# Patient Record
Sex: FEMALE | Race: OTHER | HISPANIC OR LATINO | URBAN - METROPOLITAN AREA
[De-identification: names, ages, dates, MRNs, and addresses within clinical notes are randomized per-mention and may not be internally consistent; named-entity substitution may affect disease eponyms.]

---

## 2023-05-29 ENCOUNTER — INPATIENT (INPATIENT)
Facility: HOSPITAL | Age: 88
LOS: 1 days | Discharge: ANOTHER HOSPITAL | DRG: 305 | End: 2023-05-31
Attending: HOSPITALIST | Admitting: HOSPITALIST
Payer: COMMERCIAL

## 2023-05-29 VITALS
DIASTOLIC BLOOD PRESSURE: 84 MMHG | HEART RATE: 104 BPM | OXYGEN SATURATION: 99 % | HEIGHT: 62 IN | TEMPERATURE: 98 F | WEIGHT: 121.03 LBS | RESPIRATION RATE: 18 BRPM | SYSTOLIC BLOOD PRESSURE: 175 MMHG

## 2023-05-29 DIAGNOSIS — R07.9 CHEST PAIN, UNSPECIFIED: ICD-10-CM

## 2023-05-29 LAB
ALBUMIN SERPL ELPH-MCNC: 3.3 G/DL — LOW (ref 3.5–5)
ALP SERPL-CCNC: 60 U/L — SIGNIFICANT CHANGE UP (ref 40–120)
ALT FLD-CCNC: 21 U/L DA — SIGNIFICANT CHANGE UP (ref 10–60)
ANION GAP SERPL CALC-SCNC: 4 MMOL/L — LOW (ref 5–17)
AST SERPL-CCNC: 38 U/L — SIGNIFICANT CHANGE UP (ref 10–40)
BASOPHILS # BLD AUTO: 0.03 K/UL — SIGNIFICANT CHANGE UP (ref 0–0.2)
BASOPHILS NFR BLD AUTO: 0.5 % — SIGNIFICANT CHANGE UP (ref 0–2)
BILIRUB SERPL-MCNC: 0.5 MG/DL — SIGNIFICANT CHANGE UP (ref 0.2–1.2)
BUN SERPL-MCNC: 22 MG/DL — HIGH (ref 7–18)
CALCIUM SERPL-MCNC: 9 MG/DL — SIGNIFICANT CHANGE UP (ref 8.4–10.5)
CHLORIDE SERPL-SCNC: 100 MMOL/L — SIGNIFICANT CHANGE UP (ref 96–108)
CO2 SERPL-SCNC: 26 MMOL/L — SIGNIFICANT CHANGE UP (ref 22–31)
CREAT SERPL-MCNC: 0.64 MG/DL — SIGNIFICANT CHANGE UP (ref 0.5–1.3)
EGFR: 86 ML/MIN/1.73M2 — SIGNIFICANT CHANGE UP
EOSINOPHIL # BLD AUTO: 0.2 K/UL — SIGNIFICANT CHANGE UP (ref 0–0.5)
EOSINOPHIL NFR BLD AUTO: 3.6 % — SIGNIFICANT CHANGE UP (ref 0–6)
GLUCOSE SERPL-MCNC: 95 MG/DL — SIGNIFICANT CHANGE UP (ref 70–99)
HCT VFR BLD CALC: 35.7 % — SIGNIFICANT CHANGE UP (ref 34.5–45)
HGB BLD-MCNC: 12.2 G/DL — SIGNIFICANT CHANGE UP (ref 11.5–15.5)
IMM GRANULOCYTES NFR BLD AUTO: 0.2 % — SIGNIFICANT CHANGE UP (ref 0–0.9)
LIDOCAIN IGE QN: 120 U/L — SIGNIFICANT CHANGE UP (ref 73–393)
LYMPHOCYTES # BLD AUTO: 2.26 K/UL — SIGNIFICANT CHANGE UP (ref 1–3.3)
LYMPHOCYTES # BLD AUTO: 41 % — SIGNIFICANT CHANGE UP (ref 13–44)
MAGNESIUM SERPL-MCNC: 2.1 MG/DL — SIGNIFICANT CHANGE UP (ref 1.6–2.6)
MCHC RBC-ENTMCNC: 29.6 PG — SIGNIFICANT CHANGE UP (ref 27–34)
MCHC RBC-ENTMCNC: 34.2 GM/DL — SIGNIFICANT CHANGE UP (ref 32–36)
MCV RBC AUTO: 86.7 FL — SIGNIFICANT CHANGE UP (ref 80–100)
MONOCYTES # BLD AUTO: 0.46 K/UL — SIGNIFICANT CHANGE UP (ref 0–0.9)
MONOCYTES NFR BLD AUTO: 8.3 % — SIGNIFICANT CHANGE UP (ref 2–14)
NEUTROPHILS # BLD AUTO: 2.55 K/UL — SIGNIFICANT CHANGE UP (ref 1.8–7.4)
NEUTROPHILS NFR BLD AUTO: 46.4 % — SIGNIFICANT CHANGE UP (ref 43–77)
NRBC # BLD: 0 /100 WBCS — SIGNIFICANT CHANGE UP (ref 0–0)
NT-PROBNP SERPL-SCNC: 327 PG/ML — SIGNIFICANT CHANGE UP (ref 0–450)
PLATELET # BLD AUTO: 163 K/UL — SIGNIFICANT CHANGE UP (ref 150–400)
POTASSIUM SERPL-MCNC: 4.6 MMOL/L — SIGNIFICANT CHANGE UP (ref 3.5–5.3)
POTASSIUM SERPL-SCNC: 4.6 MMOL/L — SIGNIFICANT CHANGE UP (ref 3.5–5.3)
PROT SERPL-MCNC: 7.1 G/DL — SIGNIFICANT CHANGE UP (ref 6–8.3)
RBC # BLD: 4.12 M/UL — SIGNIFICANT CHANGE UP (ref 3.8–5.2)
RBC # FLD: 13.2 % — SIGNIFICANT CHANGE UP (ref 10.3–14.5)
SODIUM SERPL-SCNC: 130 MMOL/L — LOW (ref 135–145)
TROPONIN I, HIGH SENSITIVITY RESULT: 9.7 NG/L — SIGNIFICANT CHANGE UP
WBC # BLD: 5.51 K/UL — SIGNIFICANT CHANGE UP (ref 3.8–10.5)
WBC # FLD AUTO: 5.51 K/UL — SIGNIFICANT CHANGE UP (ref 3.8–10.5)

## 2023-05-29 PROCEDURE — 71045 X-RAY EXAM CHEST 1 VIEW: CPT | Mod: 26

## 2023-05-29 PROCEDURE — 99285 EMERGENCY DEPT VISIT HI MDM: CPT

## 2023-05-29 PROCEDURE — 99223 1ST HOSP IP/OBS HIGH 75: CPT

## 2023-05-29 RX ORDER — ONDANSETRON 8 MG/1
4 TABLET, FILM COATED ORAL EVERY 8 HOURS
Refills: 0 | Status: DISCONTINUED | OUTPATIENT
Start: 2023-05-29 | End: 2023-05-31

## 2023-05-29 RX ORDER — ENOXAPARIN SODIUM 100 MG/ML
40 INJECTION SUBCUTANEOUS EVERY 24 HOURS
Refills: 0 | Status: DISCONTINUED | OUTPATIENT
Start: 2023-05-29 | End: 2023-05-31

## 2023-05-29 RX ORDER — ACETAMINOPHEN 500 MG
650 TABLET ORAL EVERY 6 HOURS
Refills: 0 | Status: DISCONTINUED | OUTPATIENT
Start: 2023-05-29 | End: 2023-05-31

## 2023-05-29 RX ORDER — LANOLIN ALCOHOL/MO/W.PET/CERES
3 CREAM (GRAM) TOPICAL AT BEDTIME
Refills: 0 | Status: DISCONTINUED | OUTPATIENT
Start: 2023-05-29 | End: 2023-05-31

## 2023-05-29 NOTE — H&P ADULT - HISTORY OF PRESENT ILLNESS
87 year old, female PMH HLD, HTN, arthritis, osteoporosis , CAD comes in complaining of gradual onset, stable, intermittent, diffuse chest pain last week. Came to the ED today due to high blood pressure associated with bilateral arm pain L>>R and associated with the chest pain last week. At home, The highest BP was 180s systolic.     Describes the arm pain with exertion and while in a moving car, and associated with dizziness, as well as purple in the hand and finger tips, hiccups, shortness of breath  and nausea and occasional palpitations.   No radiation, alleviation, aggravating factors.     She denies fever, chills, vomiting, recent illness. No changes in bowel or bladder function.     In the ED /84, heart rate 104 EKG  Afebrile and saturating 99% on room air  Labs, unremarkable, troponins negative x1   87 year old, female PMH HLD, HTN, arthritis, osteoporosis , CAD comes in complaining of gradual onset, stable, intermittent, diffuse chest pain last week. Came to the ED today due to high blood pressure associated with bilateral arm pain L>>R and associated with the chest pain last week. At home, The highest BP was 180s systolic.     Describes the arm pain with exertion and while in a moving car, and associated with dizziness, as well as purple in the hand and finger tips, hiccups, shortness of breath  and nausea and occasional palpitations.   No radiation, alleviation, aggravating factors.     She denies fever, chills, vomiting, recent illness. No changes in bowel or bladder function.     In the ED /84, heart rate 104 EKG no NORI  Afebrile and saturating 99% on room air  Labs, unremarkable, troponins negative x1

## 2023-05-29 NOTE — ED ADULT NURSE NOTE - NSFALLHARMRISKINTERV_ED_ALL_ED

## 2023-05-29 NOTE — H&P ADULT - NSHPLABSRESULTS_GEN_ALL_CORE
12.2   5.51  )-----------( 163      ( 29 May 2023 20:09 )             35.7     05-29    130<L>  |  100  |  22<H>  ----------------------------<  95  4.6   |  26  |  0.64    Ca    9.0      29 May 2023 18:00  Mg     2.1     05-29    TPro  7.1  /  Alb  3.3<L>  /  TBili  0.5  /  DBili  x   /  AST  38  /  ALT  21  /  AlkPhos  60  05-29        LIVER FUNCTIONS - ( 29 May 2023 18:00 )  Alb: 3.3 g/dL / Pro: 7.1 g/dL / ALK PHOS: 60 U/L / ALT: 21 U/L DA / AST: 38 U/L / GGT: x                     EKG: sinus no lorenzo changes, sllight QRS widening

## 2023-05-29 NOTE — H&P ADULT - NSICDXPASTMEDICALHX_GEN_ALL_CORE_FT
PAST MEDICAL HISTORY:  Arthritis     CAD (coronary artery disease)     HLD (hyperlipidemia)     HTN (hypertension)     Osteoporosis

## 2023-05-29 NOTE — H&P ADULT - NSHPPHYSICALEXAM_GEN_ALL_CORE
T(C): 36.9 (05-29-23 @ 16:36), Max: 36.9 (05-29-23 @ 16:36)  T(F): 98.5 (05-29-23 @ 16:36), Max: 98.5 (05-29-23 @ 16:36)  HR: 104 (05-29-23 @ 16:36) (104 - 104)  BP: 175/84 (05-29-23 @ 16:36) (175/84 - 175/84)  RR: 18 (05-29-23 @ 16:36) (18 - 18)  SpO2: 99% (05-29-23 @ 16:36) (99% - 99%)    GENERAL: NAD, lying in bed comfortably  HEAD:  Atraumatic, Normocephalic  EYES: EOMI, PERRLA, conjunctiva and sclera clear  ENT: Moist mucous membranes  NECK: Supple, No JVD  CHEST/LUNG: Clear to auscultation bilaterally; No rales, rhonchi, wheezing, or rubs. Unlabored respirations  HEART: Regular rate and rhythm; No murmurs, rubs, or gallops  ABDOMEN: Bowel sounds present; Soft, Nontender, Nondistended. No hepatomegally  EXTREMITIES:  2+ Peripheral Pulses, brisk capillary refill. No clubbing, cyanosis, or edema  NERVOUS SYSTEM:  Alert & Oriented X3, speech clear. No deficits   MSK: FROM all 4 extremities, full and equal strength  SKIN: No rashes or lesions

## 2023-05-29 NOTE — H&P ADULT - PROBLEM SELECTOR PLAN 1
Pt states she has had arm and chest pain   In the setting of high  BP in the 180s, patient states that in the past, she has not had any severe symptoms even with BP in the 200s    -BP in the ED is 170s and improved  -Pain is also improved  -purple discoloration during epidsodes, likely due to vasoconstrition   -used to take amlodipine last year, now on HCTZ   -could consider resuming, or Nifedipine Comes in with arm pain, associated with chest pain   found to have EKG with sinus no lorenzo   trop wnl  -Pain happened at rest:- unstable chest pain   now admitted to rule out ACS  no caffeine  f/u lipid panel, A1c, TSH  f/u Echo  maintain on tele  cardiology Bola Weiss

## 2023-05-29 NOTE — ED PROVIDER NOTE - OBJECTIVE STATEMENT
86 y/o female, history of hypertension and CAD, presenting w/ intermittent chest pain and shortness of breath, worse w/ exertion, accompanied by dizziness. Patient is accompanied by home health aide. No known drug allergies.

## 2023-05-29 NOTE — H&P ADULT - NSHPREVIEWOFSYSTEMS_GEN_ALL_CORE
REVIEW OF SYSTEMS:    CONSTITUTIONAL: No weakness, fevers or chills  EYES/ENT: No visual changes;  No vertigo or throat pain   NECK: No pain or stiffness  RESPIRATORY: No cough, wheezing, hemoptysis; +SOB  CARDIOVASCULAR: + chest pain and palpitations  GASTROINTESTINAL: No abdominal or epigastric pain. No nausea, vomiting, or hematemesis; No diarrhea or constipation. No melena or hematochezia.  GENITOURINARY: No dysuria, frequency or hematuria  NEUROLOGICAL: No numbness or weakness, arm pain soreness  SKIN: No itching, rashes

## 2023-05-29 NOTE — ED PROVIDER NOTE - CLINICAL SUMMARY MEDICAL DECISION MAKING FREE TEXT BOX
86 y/o female w/ chest pain. Concern for ACS. No signs to suggest PE. Will perform EKG, labs, chest X-ray, and reassess.

## 2023-05-29 NOTE — H&P ADULT - PROBLEM SELECTOR PLAN 2
Comes in with arm pain, associated with chest pain   found to have EKG with   Trop and BNP elevated  now admitted to rule out ACS  f/u lipid panel, A1c, TSH  f/u Echo  maintain on tele  cardiology Dr; consulted Comes in with arm pain, associated with chest pain   found to have EKG with sinus no lorenzo   trop wnl  now admitted to rule out ACS  f/u lipid panel, A1c, TSH  f/u Echo  maintain on tele  cardiology Bola Weiss Pt states she has had arm and chest pain   In the setting of high  BP in the 180s, patient states that in the past, she has not had any severe symptoms even with BP in the 200s    -BP in the ED is 170s and improved  -Pain is also improved  -purple discoloration during epidsodes, likely due to vasoconstriction   -used to take amlodipine last year, now on HCTZ   -could consider resuming, or Nifedipine

## 2023-05-29 NOTE — ED PROVIDER NOTE - HIV OFFER
Will need an amiodarone work up in June.     Thanks, [employer]
oriented to person, place, time and situation
Previously Declined (within the last year)

## 2023-05-29 NOTE — H&P ADULT - ATTENDING COMMENTS
Hx of CAD, HTN   +CP, dyspnea with effort    Vital Signs Last 24 Hrs  T(C): 36.9 (29 May 2023 16:36), Max: 36.9 (29 May 2023 16:36)  T(F): 98.5 (29 May 2023 16:36), Max: 98.5 (29 May 2023 16:36)  HR: 104 (29 May 2023 16:36) (104 - 104)  BP: 175/84 (29 May 2023 16:36) (175/84 - 175/84)  RR: 18 (29 May 2023 16:36) (18 - 18)  SpO2: 99% (29 May 2023 16:36) (99% - 99%)  Parameters below as of 29 May 2023 16:36  Patient On (Oxygen Delivery Method): room air    Labs reviewed  cbc unremarkable   Na 130  Trop in normal range  CXR   - no acute findings on my review. Final report pending     EKG reviewed    Impression   Acute chest pain   - concern for unstable angina  trop and ekg unremarkable     Plan   admit to telemetry   serial troponin and EKG as needed  BB/statin/ASA  Pain control - SLNG 0.4mg q 5mins X 3 prn                              morphine   A1c/lipid panel - fasting   ECHO   Cardiology consult Hx of CAD, HTN   +CP, dyspnea with effort a week prior  Discussed with Dr Grullon    Vital Signs Last 24 Hrs  T(C): 36.9 (29 May 2023 16:36), Max: 36.9 (29 May 2023 16:36)  T(F): 98.5 (29 May 2023 16:36), Max: 98.5 (29 May 2023 16:36)  HR: 104 (29 May 2023 16:36) (104 - 104)  BP: 175/84 (29 May 2023 16:36) (175/84 - 175/84)  RR: 18 (29 May 2023 16:36) (18 - 18)  SpO2: 99% (29 May 2023 16:36) (99% - 99%)  Parameters below as of 29 May 2023 16:36  Patient On (Oxygen Delivery Method): room air    - Labs reviewed  cbc unremarkable   Na 130  Trop in normal range    CXR   - no acute findings on my review. Final report pending     EKG reviewed- no acute ST or T wave changes    Impression   Acute chest pain   - concern for unstable angina  trop and EKG unremarkable     HTN   uncontrolled despite compliance on meds    Plan   admit to telemetry   serial troponin and EKG as needed  BB/statin/ASA  Pain control - SLNG 0.4mg q 5mins X 3 prn                              morphine   A1c/lipid panel - fasting   ECHO   Cardiology consult  Stress test   Resume BP meds   Monitor BP

## 2023-05-29 NOTE — H&P ADULT - ASSESSMENT
87 year old, female PMH HLD, HTN, arthritis, osteoporosis , CAD comes in complaining of gradual onset, stable, intermittent, diffuse chest pain last week. Came to the ED today due to high blood pressure associated with bilateral arm pain L>>R admitted to rule out ACS.

## 2023-05-29 NOTE — ED PROVIDER NOTE - CARE PLAN
Detail Level: Detailed General Sunscreen Counseling: I recommended a broad spectrum sunscreen with a SPF of 30 or higher.  I explained that SPF 30 sunscreens block approximately 97 percent of the sun's harmful rays.  Sunscreens should be applied at least 15 minutes prior to expected sun exposure and then every 2 hours after that as long as sun exposure continues. If swimming or exercising sunscreen should be reapplied every 45 minutes to an hour after getting wet or sweating. 1 Principal Discharge DX:	Chest pain

## 2023-05-30 DIAGNOSIS — Z29.9 ENCOUNTER FOR PROPHYLACTIC MEASURES, UNSPECIFIED: ICD-10-CM

## 2023-05-30 DIAGNOSIS — I16.0 HYPERTENSIVE URGENCY: ICD-10-CM

## 2023-05-30 DIAGNOSIS — I25.10 ATHEROSCLEROTIC HEART DISEASE OF NATIVE CORONARY ARTERY WITHOUT ANGINA PECTORIS: ICD-10-CM

## 2023-05-30 DIAGNOSIS — I10 ESSENTIAL (PRIMARY) HYPERTENSION: ICD-10-CM

## 2023-05-30 DIAGNOSIS — R07.9 CHEST PAIN, UNSPECIFIED: ICD-10-CM

## 2023-05-30 DIAGNOSIS — Z86.79 PERSONAL HISTORY OF OTHER DISEASES OF THE CIRCULATORY SYSTEM: Chronic | ICD-10-CM

## 2023-05-30 LAB
A1C WITH ESTIMATED AVERAGE GLUCOSE RESULT: 6.3 % — HIGH (ref 4–5.6)
ALBUMIN SERPL ELPH-MCNC: 3.2 G/DL — LOW (ref 3.5–5)
ALP SERPL-CCNC: 60 U/L — SIGNIFICANT CHANGE UP (ref 40–120)
ALT FLD-CCNC: 20 U/L DA — SIGNIFICANT CHANGE UP (ref 10–60)
ANION GAP SERPL CALC-SCNC: 5 MMOL/L — SIGNIFICANT CHANGE UP (ref 5–17)
APPEARANCE UR: CLEAR — SIGNIFICANT CHANGE UP
AST SERPL-CCNC: 14 U/L — SIGNIFICANT CHANGE UP (ref 10–40)
BACTERIA # UR AUTO: NEGATIVE /HPF — SIGNIFICANT CHANGE UP
BASOPHILS # BLD AUTO: 0.03 K/UL — SIGNIFICANT CHANGE UP (ref 0–0.2)
BASOPHILS NFR BLD AUTO: 0.6 % — SIGNIFICANT CHANGE UP (ref 0–2)
BILIRUB SERPL-MCNC: 0.4 MG/DL — SIGNIFICANT CHANGE UP (ref 0.2–1.2)
BILIRUB UR-MCNC: NEGATIVE — SIGNIFICANT CHANGE UP
BUN SERPL-MCNC: 21 MG/DL — HIGH (ref 7–18)
CALCIUM SERPL-MCNC: 9.1 MG/DL — SIGNIFICANT CHANGE UP (ref 8.4–10.5)
CHLORIDE SERPL-SCNC: 104 MMOL/L — SIGNIFICANT CHANGE UP (ref 96–108)
CHOLEST SERPL-MCNC: 219 MG/DL — HIGH
CO2 SERPL-SCNC: 25 MMOL/L — SIGNIFICANT CHANGE UP (ref 22–31)
COLOR SPEC: YELLOW — SIGNIFICANT CHANGE UP
COMMENT - URINE: SIGNIFICANT CHANGE UP
CREAT SERPL-MCNC: 0.85 MG/DL — SIGNIFICANT CHANGE UP (ref 0.5–1.3)
DIFF PNL FLD: ABNORMAL
EGFR: 67 ML/MIN/1.73M2 — SIGNIFICANT CHANGE UP
EOSINOPHIL # BLD AUTO: 0.23 K/UL — SIGNIFICANT CHANGE UP (ref 0–0.5)
EOSINOPHIL NFR BLD AUTO: 4.2 % — SIGNIFICANT CHANGE UP (ref 0–6)
EPI CELLS # UR: SIGNIFICANT CHANGE UP /HPF
ESTIMATED AVERAGE GLUCOSE: 134 MG/DL — HIGH (ref 68–114)
GLUCOSE SERPL-MCNC: 104 MG/DL — HIGH (ref 70–99)
GLUCOSE UR QL: NEGATIVE — SIGNIFICANT CHANGE UP
HCT VFR BLD CALC: 36.4 % — SIGNIFICANT CHANGE UP (ref 34.5–45)
HDLC SERPL-MCNC: 38 MG/DL — LOW
HGB BLD-MCNC: 12.4 G/DL — SIGNIFICANT CHANGE UP (ref 11.5–15.5)
IMM GRANULOCYTES NFR BLD AUTO: 0.2 % — SIGNIFICANT CHANGE UP (ref 0–0.9)
KETONES UR-MCNC: NEGATIVE — SIGNIFICANT CHANGE UP
LEUKOCYTE ESTERASE UR-ACNC: NEGATIVE — SIGNIFICANT CHANGE UP
LIPID PNL WITH DIRECT LDL SERPL: 146 MG/DL — HIGH
LYMPHOCYTES # BLD AUTO: 2.52 K/UL — SIGNIFICANT CHANGE UP (ref 1–3.3)
LYMPHOCYTES # BLD AUTO: 46.3 % — HIGH (ref 13–44)
MAGNESIUM SERPL-MCNC: 2.1 MG/DL — SIGNIFICANT CHANGE UP (ref 1.6–2.6)
MCHC RBC-ENTMCNC: 29.8 PG — SIGNIFICANT CHANGE UP (ref 27–34)
MCHC RBC-ENTMCNC: 34.1 GM/DL — SIGNIFICANT CHANGE UP (ref 32–36)
MCV RBC AUTO: 87.5 FL — SIGNIFICANT CHANGE UP (ref 80–100)
MONOCYTES # BLD AUTO: 0.54 K/UL — SIGNIFICANT CHANGE UP (ref 0–0.9)
MONOCYTES NFR BLD AUTO: 9.9 % — SIGNIFICANT CHANGE UP (ref 2–14)
NEUTROPHILS # BLD AUTO: 2.11 K/UL — SIGNIFICANT CHANGE UP (ref 1.8–7.4)
NEUTROPHILS NFR BLD AUTO: 38.8 % — LOW (ref 43–77)
NITRITE UR-MCNC: NEGATIVE — SIGNIFICANT CHANGE UP
NON HDL CHOLESTEROL: 181 MG/DL — HIGH
NRBC # BLD: 0 /100 WBCS — SIGNIFICANT CHANGE UP (ref 0–0)
PH UR: 6 — SIGNIFICANT CHANGE UP (ref 5–8)
PHOSPHATE SERPL-MCNC: 3.5 MG/DL — SIGNIFICANT CHANGE UP (ref 2.5–4.5)
PLATELET # BLD AUTO: 166 K/UL — SIGNIFICANT CHANGE UP (ref 150–400)
POTASSIUM SERPL-MCNC: 3.8 MMOL/L — SIGNIFICANT CHANGE UP (ref 3.5–5.3)
POTASSIUM SERPL-SCNC: 3.8 MMOL/L — SIGNIFICANT CHANGE UP (ref 3.5–5.3)
PROT SERPL-MCNC: 6.6 G/DL — SIGNIFICANT CHANGE UP (ref 6–8.3)
PROT UR-MCNC: 15 MG/DL
RBC # BLD: 4.16 M/UL — SIGNIFICANT CHANGE UP (ref 3.8–5.2)
RBC # FLD: 13.3 % — SIGNIFICANT CHANGE UP (ref 10.3–14.5)
RBC CASTS # UR COMP ASSIST: SIGNIFICANT CHANGE UP /HPF (ref 0–2)
SODIUM SERPL-SCNC: 134 MMOL/L — LOW (ref 135–145)
SP GR SPEC: 1.01 — SIGNIFICANT CHANGE UP (ref 1.01–1.02)
TRIGL SERPL-MCNC: 176 MG/DL — HIGH
TROPONIN I, HIGH SENSITIVITY RESULT: 10.5 NG/L — SIGNIFICANT CHANGE UP
TSH SERPL-MCNC: 1.66 UU/ML — SIGNIFICANT CHANGE UP (ref 0.34–4.82)
UROBILINOGEN FLD QL: NEGATIVE — SIGNIFICANT CHANGE UP
WBC # BLD: 5.44 K/UL — SIGNIFICANT CHANGE UP (ref 3.8–10.5)
WBC # FLD AUTO: 5.44 K/UL — SIGNIFICANT CHANGE UP (ref 3.8–10.5)
WBC UR QL: ABNORMAL /HPF (ref 0–5)

## 2023-05-30 PROCEDURE — 99232 SBSQ HOSP IP/OBS MODERATE 35: CPT | Mod: GC

## 2023-05-30 RX ORDER — ASPIRIN/CALCIUM CARB/MAGNESIUM 324 MG
81 TABLET ORAL DAILY
Refills: 0 | Status: DISCONTINUED | OUTPATIENT
Start: 2023-05-30 | End: 2023-05-31

## 2023-05-30 RX ORDER — METOPROLOL TARTRATE 50 MG
25 TABLET ORAL
Refills: 0 | Status: DISCONTINUED | OUTPATIENT
Start: 2023-05-30 | End: 2023-05-31

## 2023-05-30 RX ORDER — GABAPENTIN 400 MG/1
100 CAPSULE ORAL AT BEDTIME
Refills: 0 | Status: DISCONTINUED | OUTPATIENT
Start: 2023-05-30 | End: 2023-05-31

## 2023-05-30 RX ORDER — LOSARTAN POTASSIUM 100 MG/1
100 TABLET, FILM COATED ORAL DAILY
Refills: 0 | Status: DISCONTINUED | OUTPATIENT
Start: 2023-05-30 | End: 2023-05-31

## 2023-05-30 RX ORDER — ATORVASTATIN CALCIUM 80 MG/1
40 TABLET, FILM COATED ORAL AT BEDTIME
Refills: 0 | Status: DISCONTINUED | OUTPATIENT
Start: 2023-05-30 | End: 2023-05-31

## 2023-05-30 RX ADMIN — GABAPENTIN 100 MILLIGRAM(S): 400 CAPSULE ORAL at 21:16

## 2023-05-30 RX ADMIN — ATORVASTATIN CALCIUM 40 MILLIGRAM(S): 80 TABLET, FILM COATED ORAL at 21:16

## 2023-05-30 RX ADMIN — LOSARTAN POTASSIUM 100 MILLIGRAM(S): 100 TABLET, FILM COATED ORAL at 06:15

## 2023-05-30 RX ADMIN — ENOXAPARIN SODIUM 40 MILLIGRAM(S): 100 INJECTION SUBCUTANEOUS at 06:15

## 2023-05-30 RX ADMIN — Medication 25 MILLIGRAM(S): at 06:14

## 2023-05-30 RX ADMIN — Medication 81 MILLIGRAM(S): at 11:21

## 2023-05-30 RX ADMIN — Medication 25 MILLIGRAM(S): at 17:10

## 2023-05-30 NOTE — CONSULT NOTE ADULT - SUBJECTIVE AND OBJECTIVE BOX
PATIENT SEEN AND EXAMINED ON :- 5/30/23  DATE OF SERVICE:    5/30/23         Interim events noted,Labs ,Radiological studies and Cardiology tests reviewed .    MR#[id number]  PATIENT NAME:-[facility] COURSE: HPI:  87 year old, female PMH HLD, HTN, arthritis, osteoporosis , CAD comes in complaining of gradual onset, stable, intermittent, diffuse chest pain last week. Came to the ED today due to high blood pressure associated with bilateral arm pain L>>R and associated with the chest pain last week. At home, The highest BP was 180s systolic.     Describes the arm pain with exertion and while in a moving car, and associated with dizziness, as well as purple in the hand and finger tips, hiccups, shortness of breath  and nausea and occasional palpitations.   No radiation, alleviation, aggravating factors.     She denies fever, chills, vomiting, recent illness. No changes in bowel or bladder function.     In the ED /84, heart rate 104 EKG no NORI  Afebrile and saturating 99% on room air  Labs, unremarkable, troponins negative x1   (29 May 2023 22:10)      INTERIM EVENTS:Patient seen at bedside ,interim events noted.      [facility] -reviewed admission note, no change since admission  HEART FAILURE: Acute[ ]Chronic[ ] Systolic[ ] Diastolic[ ] Combined Systolic and Diastolic[ ]  CAD[ ] CABG[ ] PCI[ ]  DEVICES[ ] PPM[ ] ICD[ ] ILR[ ]  ATRIAL FIBRILLATION[ ] Paroxysmal[ ] Permanent[ ] CHADS2-[  ]  EVAN[ ] CKD1[ ] CKD2[ ] CKD3[ ] CKD4[ ] ESRD[ ]  COPD[ ] HTN[ ]   DM[ ] Type1[ ] Type 2[ ]   CVA[ ] Paresis[ ]    AMBULATION: Assisted[ ] Cane/walker[ ] Independent[ ]    MEDICATIONS  (STANDING):  aspirin  chewable 81 milliGRAM(s) Oral daily  atorvastatin 40 milliGRAM(s) Oral at bedtime  enoxaparin Injectable 40 milliGRAM(s) SubCutaneous every 24 hours  gabapentin 100 milliGRAM(s) Oral at bedtime  hydrochlorothiazide 25 milliGRAM(s) Oral daily  losartan 100 milliGRAM(s) Oral daily  metoprolol tartrate 25 milliGRAM(s) Oral two times a day    MEDICATIONS  (PRN):  acetaminophen     Tablet .. 650 milliGRAM(s) Oral every 6 hours PRN Temp greater or equal to 38C (100.4F), Mild Pain (1 - 3)  aluminum hydroxide/magnesium hydroxide/simethicone Suspension 30 milliLiter(s) Oral every 4 hours PRN Dyspepsia  melatonin 3 milliGRAM(s) Oral at bedtime PRN Insomnia  ondansetron Injectable 4 milliGRAM(s) IV Push every 8 hours PRN Nausea and/or Vomiting            REVIEW OF SYSTEMS:  Constitutional: [ ] fever, [ ]weight loss,  [ ]fatigue [ ]weight gain  Eyes: [ ] visual changes  Respiratory: [ ]shortness of breath;  [ ] cough, [ ]wheezing, [ ]chills, [ ]hemoptysis  Cardiovascular: [ ] chest pain, [ ]palpitations, [ ]dizziness,  [ ]leg swelling[ ]orthopnea[ ]PND  Gastrointestinal: [ ] abdominal pain, [ ]nausea, [ ]vomiting,  [ ]diarrhea [ ]Constipation [ ]Melena  Genitourinary: [ ] dysuria, [ ] hematuria [ ]Hickey  Neurologic: [ ] headaches [ ] tremors[ ]weakness [ ]Paralysis Right[ ] Left[ ]  Skin: [ ] itching, [ ]burning, [ ] rashes  Endocrine: [ ] heat or cold intolerance  Musculoskeletal: [ ] joint pain or swelling; [ ] muscle, back, or extremity pain  Psychiatric: [ ] depression, [ ]anxiety, [ ]mood swings, or [ ]difficulty sleeping  Hematologic: [ ] easy bruising, [ ] bleeding gums    [ ] All remaining systems negative except as per above.   [ ]Unable to obtain.  [x] No change in ROS since admission      Vital Signs Last 24 Hrs  T(C): 36.8 (30 May 2023 19:33), Max: 37.1 (30 May 2023 04:56)  T(F): 98.2 (30 May 2023 19:33), Max: 98.8 (30 May 2023 04:56)  HR: 63 (30 May 2023 19:33) (62 - 75)  BP: 166/73 (30 May 2023 19:33) (123/63 - 166/73)  BP(mean): --  RR: 18 (30 May 2023 19:33) (18 - 19)  SpO2: 99% (30 May 2023 19:33) (96% - 100%)    Parameters below as of 30 May 2023 19:33  Patient On (Oxygen Delivery Method): room air      I&O's Summary      PHYSICAL EXAM:  General: No acute distress BMI-  HEENT: EOMI, PERRL  Neck: Supple, [ ] JVD  Lungs: Equal air entry bilaterally; [ ] rales [ ] wheezing [ ] rhonchi  Heart: Regular rate and rhythm; [x ] murmur   2/6 [ x] systolic [ ] diastolic [ ] radiation[ ] rubs [ ]  gallops  Abdomen: Nontender, bowel sounds present  Extremities: No clubbing, cyanosis, [ ] edema [ ]Pulses  equal and intact  Nervous system:  Alert & Oriented X3, no focal deficits  Psychiatric: Normal affect  Skin: No rashes or lesions    LABS:  05-30    134<L>  |  104  |  21<H>  ----------------------------<  104<H>  3.8   |  25  |  0.85    Ca    9.1      30 May 2023 06:10  Phos  3.5     05-30  Mg     2.1     05-30    TPro  6.6  /  Alb  3.2<L>  /  TBili  0.4  /  DBili  x   /  AST  14  /  ALT  20  /  AlkPhos  60  05-30    Creatinine Trend: 0.85<--, 0.64<--                        12.4   5.44  )-----------( 166      ( 30 May 2023 06:10 )             36.4

## 2023-05-30 NOTE — CONSULT NOTE ADULT - ASSESSMENT
87 year old, female PMH HLD, HTN, arthritis, osteoporosis , CAD comes in complaining of gradual onset, stable, intermittent, diffuse chest pain last week. Came to the ED today due to high blood pressure associated with bilateral arm pain L>>R admitted to rule out ACS.    # Chest pain-  ACS, f/u Echo, D/C tele  # Uncontrolled hypertension, amlodipine and hctz. losartan   # CAD0 simvastatin, asa and beta blocker and losartan   # Prophylactic measure

## 2023-05-30 NOTE — PROGRESS NOTE ADULT - PROBLEM SELECTOR PLAN 1
Comes in with arm pain, associated with chest pain   found to have EKG with sinus no lorenzo   trop wnl  -Pain happened at rest:- unstable chest pain   now admitted to rule out ACS  no caffeine  f/u lipid panel, A1c, TSH  f/u Echo  maintain on tele  cardiology Bola Weiss presented with arm pain, associated with chest pain   found to have EKG with sinus no lorenzo   trop wnl  Pain happened at rest:- unstable chest pain   now admitted to rule out ACS  no caffeine  f/u lipid panel, A1c, TSH  f/u Echo  D/C tele  patient without chest pain  cardiology Bola Weiss

## 2023-05-30 NOTE — CONSULT NOTE ADULT - TIME BILLING
- Review of records, telemetry, vital signs and daily labs.   - General and cardiovascular physical examination.  - Generation of cardiovascular treatment plan.  - Coordination of care.      Patient was seen and examined by me on 5/30/23,interim events noted,labs and radiology studies reviewed.  Freddy Weiss MD,FACC.  [address] [place], [place] [id number].  [phone]

## 2023-05-30 NOTE — PATIENT PROFILE ADULT - FALL HARM RISK - RISK INTERVENTIONS

## 2023-05-30 NOTE — PROGRESS NOTE ADULT - SUBJECTIVE AND OBJECTIVE BOX
PGY-1 Progress Note discussed with attending      PLEASE CONTACT ON CALL TEAM:  - On Call Team (Please refer to Tyson) FROM 5:00 PM - 8:30PM  - Nightfloat Team FROM 8:30 -7:30 AM    INTERVAL HPI/OVERNIGHT EVENTS:       REVIEW OF SYSTEMS:  CONSTITUTIONAL: No fever, weight loss, or fatigue  RESPIRATORY: No cough, wheezing, chills or hemoptysis; No shortness of breath  CARDIOVASCULAR: No chest pain, palpitations, dizziness, or leg swelling  GASTROINTESTINAL: No abdominal pain. No nausea, vomiting, or hematemesis; No diarrhea or constipation. No melena or hematochezia.  GENITOURINARY: No dysuria or hematuria, urinary frequency  NEUROLOGICAL: No headaches, memory loss, loss of strength, numbness, or tremors  SKIN: No itching, burning, rashes, or lesions     MEDICATIONS  (STANDING):  aspirin  chewable 81 milliGRAM(s) Oral daily  atorvastatin 40 milliGRAM(s) Oral at bedtime  enoxaparin Injectable 40 milliGRAM(s) SubCutaneous every 24 hours  gabapentin 100 milliGRAM(s) Oral at bedtime  hydrochlorothiazide 25 milliGRAM(s) Oral daily  losartan 100 milliGRAM(s) Oral daily  metoprolol tartrate 25 milliGRAM(s) Oral two times a day    MEDICATIONS  (PRN):  acetaminophen     Tablet .. 650 milliGRAM(s) Oral every 6 hours PRN Temp greater or equal to 38C (100.4F), Mild Pain (1 - 3)  aluminum hydroxide/magnesium hydroxide/simethicone Suspension 30 milliLiter(s) Oral every 4 hours PRN Dyspepsia  melatonin 3 milliGRAM(s) Oral at bedtime PRN Insomnia  ondansetron Injectable 4 milliGRAM(s) IV Push every 8 hours PRN Nausea and/or Vomiting      Vital Signs Last 24 Hrs  T(C): 37.1 (30 May 2023 04:56), Max: 37.1 (30 May 2023 04:56)  T(F): 98.8 (30 May 2023 04:56), Max: 98.8 (30 May 2023 04:56)  HR: 65 (30 May 2023 04:56) (65 - 104)  BP: 135/65 (30 May 2023 04:56) (130/64 - 175/84)  BP(mean): --  RR: 18 (30 May 2023 04:56) (18 - 18)  SpO2: 99% (30 May 2023 04:56) (96% - 99%)    Parameters below as of 30 May 2023 04:56  Patient On (Oxygen Delivery Method): room air        PHYSICAL EXAMINATION:  GENERAL: NAD, well built  HEAD:  Atraumatic, Normocephalic  EYES:  conjunctiva and sclera clear  NECK: Supple, No JVD, Normal thyroid  CHEST/LUNG: Clear to auscultation. Clear to percussion bilaterally; No rales, rhonchi, wheezing, or rubs  HEART: Regular rate and rhythm; No murmurs, rubs, or gallops  ABDOMEN: Soft, Nontender, Nondistended; Bowel sounds present, no pain or masses on palpation  NERVOUS SYSTEM:  Alert & Oriented X3  : voiding well  EXTREMITIES:  2+ Peripheral Pulses, No clubbing, cyanosis, or edema  SKIN: warm dry                          12.4   5.44  )-----------( 166      ( 30 May 2023 06:10 )             36.4     05-30    134<L>  |  104  |  21<H>  ----------------------------<  x   3.8   |  25  |  x     Ca    9.1      30 May 2023 06:10  Mg     2.1     05-29    TPro  x   /  Alb  3.2<L>  /  TBili  x   /  DBili  x   /  AST  x   /  ALT  x   /  AlkPhos  x   05-30    LIVER FUNCTIONS - ( 30 May 2023 06:10 )  Alb: 3.2 g/dL / Pro: x     / ALK PHOS: x     / ALT: x     / AST: x     / GGT: x                   I&O's Summary          CAPILLARY BLOOD GLUCOSE      RADIOLOGY & ADDITIONAL TESTS:                   PGY-1 Progress Note discussed with attending      PLEASE CONTACT ON CALL TEAM:  - On Call Team (Please refer to Tyson) FROM 5:00 PM - 8:30PM  - Nightfloat Team FROM 8:30 -7:30 AM    INTERVAL HPI/OVERNIGHT EVENTS: No acute events overnight.  Patient examined at bedside this AM.  Patient denies acute complaints.  Patient states she has no dizziness or chest pain.      REVIEW OF SYSTEMS:  CONSTITUTIONAL: No fever, weight loss, or fatigue  RESPIRATORY: No cough, wheezing, chills or hemoptysis; No shortness of breath  CARDIOVASCULAR: No chest pain, palpitations, dizziness, or leg swelling  GASTROINTESTINAL: No abdominal pain. No nausea, vomiting, or hematemesis; No diarrhea or constipation. No melena or hematochezia.  GENITOURINARY: No dysuria or hematuria, urinary frequency  NEUROLOGICAL: No headaches, memory loss, loss of strength, numbness, or tremors  SKIN: No itching, burning, rashes, or lesions     MEDICATIONS  (STANDING):  aspirin  chewable 81 milliGRAM(s) Oral daily  atorvastatin 40 milliGRAM(s) Oral at bedtime  enoxaparin Injectable 40 milliGRAM(s) SubCutaneous every 24 hours  gabapentin 100 milliGRAM(s) Oral at bedtime  hydrochlorothiazide 25 milliGRAM(s) Oral daily  losartan 100 milliGRAM(s) Oral daily  metoprolol tartrate 25 milliGRAM(s) Oral two times a day    MEDICATIONS  (PRN):  acetaminophen     Tablet .. 650 milliGRAM(s) Oral every 6 hours PRN Temp greater or equal to 38C (100.4F), Mild Pain (1 - 3)  aluminum hydroxide/magnesium hydroxide/simethicone Suspension 30 milliLiter(s) Oral every 4 hours PRN Dyspepsia  melatonin 3 milliGRAM(s) Oral at bedtime PRN Insomnia  ondansetron Injectable 4 milliGRAM(s) IV Push every 8 hours PRN Nausea and/or Vomiting      Vital Signs Last 24 Hrs  T(C): 37.1 (30 May 2023 04:56), Max: 37.1 (30 May 2023 04:56)  T(F): 98.8 (30 May 2023 04:56), Max: 98.8 (30 May 2023 04:56)  HR: 65 (30 May 2023 04:56) (65 - 104)  BP: 135/65 (30 May 2023 04:56) (130/64 - 175/84)  BP(mean): --  RR: 18 (30 May 2023 04:56) (18 - 18)  SpO2: 99% (30 May 2023 04:56) (96% - 99%)    Parameters below as of 30 May 2023 04:56  Patient On (Oxygen Delivery Method): room air        PHYSICAL EXAMINATION:  GENERAL: NAD, lying in bed comfortably  HEAD:  Atraumatic, Normocephalic  EYES: EOMI, PERRLA, conjunctiva and sclera clear  ENT: Moist mucous membranes  NECK: Supple, No JVD  CHEST/LUNG: Clear to auscultation bilaterally; No rales, rhonchi, wheezing, or rubs. Unlabored respirations  HEART: Regular rate and rhythm; No murmurs, rubs, or gallops  ABDOMEN: Bowel sounds present; Soft, Nontender, Nondistended. No hepatomegally  EXTREMITIES:  2+ Peripheral Pulses, brisk capillary refill. No clubbing, cyanosis, or edema  NERVOUS SYSTEM:  Alert & Oriented X3, speech clear. No deficits   MSK: FROM all 4 extremities, full and equal strength  SKIN: No rashes or lesions                          12.4   5.44  )-----------( 166      ( 30 May 2023 06:10 )             36.4     05-30    134<L>  |  104  |  21<H>  ----------------------------<  x   3.8   |  25  |  x     Ca    9.1      30 May 2023 06:10  Mg     2.1     05-29    TPro  x   /  Alb  3.2<L>  /  TBili  x   /  DBili  x   /  AST  x   /  ALT  x   /  AlkPhos  x   05-30    LIVER FUNCTIONS - ( 30 May 2023 06:10 )  Alb: 3.2 g/dL / Pro: x     / ALK PHOS: x     / ALT: x     / AST: x     / GGT: x                   I&O's Summary          CAPILLARY BLOOD GLUCOSE      RADIOLOGY & ADDITIONAL TESTS:

## 2023-05-30 NOTE — PROGRESS NOTE ADULT - PROBLEM SELECTOR PLAN 2
Pt states she has had arm and chest pain   In the setting of high  BP in the 180s, patient states that in the past, she has not had any severe symptoms even with BP in the 200s    -BP in the ED is 170s and improved  -Pain is also improved  -purple discoloration during epidsodes, likely due to vasoconstriction   -used to take amlodipine last year, now on HCTZ   -could consider resuming, or Nifedipine Pt states she has had arm and chest pain   In the setting of high  BP in the 180s, patient states that in the past, she has not had any severe symptoms even with BP in the 200s    -BP in the ED was 170s and improved  -Pain is also improved  -purple discoloration during epidsodes, likely due to vasoconstriction   -used to take amlodipine last year, now on HCTZ   - patient bp improved today to 133/68  - continue to monitor bp

## 2023-05-31 ENCOUNTER — INPATIENT (INPATIENT)
Facility: HOSPITAL | Age: 88
LOS: 0 days | Discharge: HOME | End: 2023-06-01
Attending: INTERNAL MEDICINE | Admitting: INTERNAL MEDICINE
Payer: MEDICARE

## 2023-05-31 VITALS
OXYGEN SATURATION: 100 % | HEART RATE: 65 BPM | DIASTOLIC BLOOD PRESSURE: 85 MMHG | TEMPERATURE: 98 F | RESPIRATION RATE: 17 BRPM | SYSTOLIC BLOOD PRESSURE: 159 MMHG

## 2023-05-31 VITALS
HEART RATE: 84 BPM | SYSTOLIC BLOOD PRESSURE: 122 MMHG | RESPIRATION RATE: 18 BRPM | OXYGEN SATURATION: 93 % | TEMPERATURE: 97 F | DIASTOLIC BLOOD PRESSURE: 78 MMHG

## 2023-05-31 DIAGNOSIS — I51.9 HEART DISEASE, UNSPECIFIED: ICD-10-CM

## 2023-05-31 DIAGNOSIS — R07.9 CHEST PAIN, UNSPECIFIED: ICD-10-CM

## 2023-05-31 DIAGNOSIS — I50.9 HEART FAILURE, UNSPECIFIED: ICD-10-CM

## 2023-05-31 DIAGNOSIS — Z86.79 PERSONAL HISTORY OF OTHER DISEASES OF THE CIRCULATORY SYSTEM: Chronic | ICD-10-CM

## 2023-05-31 PROBLEM — M19.90 UNSPECIFIED OSTEOARTHRITIS, UNSPECIFIED SITE: Chronic | Status: ACTIVE | Noted: 2023-05-30

## 2023-05-31 PROBLEM — E78.5 HYPERLIPIDEMIA, UNSPECIFIED: Chronic | Status: ACTIVE | Noted: 2023-05-30

## 2023-05-31 PROBLEM — I10 ESSENTIAL (PRIMARY) HYPERTENSION: Chronic | Status: ACTIVE | Noted: 2023-05-29

## 2023-05-31 PROBLEM — M81.0 AGE-RELATED OSTEOPOROSIS WITHOUT CURRENT PATHOLOGICAL FRACTURE: Chronic | Status: ACTIVE | Noted: 2023-05-30

## 2023-05-31 LAB
ANION GAP SERPL CALC-SCNC: 6 MMOL/L — SIGNIFICANT CHANGE UP (ref 5–17)
BUN SERPL-MCNC: 20 MG/DL — HIGH (ref 7–18)
CALCIUM SERPL-MCNC: 8.9 MG/DL — SIGNIFICANT CHANGE UP (ref 8.4–10.5)
CHLORIDE SERPL-SCNC: 104 MMOL/L — SIGNIFICANT CHANGE UP (ref 96–108)
CO2 SERPL-SCNC: 25 MMOL/L — SIGNIFICANT CHANGE UP (ref 22–31)
CREAT SERPL-MCNC: 0.65 MG/DL — SIGNIFICANT CHANGE UP (ref 0.5–1.3)
EGFR: 86 ML/MIN/1.73M2 — SIGNIFICANT CHANGE UP
GLUCOSE SERPL-MCNC: 95 MG/DL — SIGNIFICANT CHANGE UP (ref 70–99)
HCT VFR BLD CALC: 36.4 % — SIGNIFICANT CHANGE UP (ref 34.5–45)
HGB BLD-MCNC: 12.6 G/DL — SIGNIFICANT CHANGE UP (ref 11.5–15.5)
MCHC RBC-ENTMCNC: 30.3 PG — SIGNIFICANT CHANGE UP (ref 27–34)
MCHC RBC-ENTMCNC: 34.6 GM/DL — SIGNIFICANT CHANGE UP (ref 32–36)
MCV RBC AUTO: 87.5 FL — SIGNIFICANT CHANGE UP (ref 80–100)
NRBC # BLD: 0 /100 WBCS — SIGNIFICANT CHANGE UP (ref 0–0)
PLATELET # BLD AUTO: 161 K/UL — SIGNIFICANT CHANGE UP (ref 150–400)
POTASSIUM SERPL-MCNC: 3.8 MMOL/L — SIGNIFICANT CHANGE UP (ref 3.5–5.3)
POTASSIUM SERPL-SCNC: 3.8 MMOL/L — SIGNIFICANT CHANGE UP (ref 3.5–5.3)
RBC # BLD: 4.16 M/UL — SIGNIFICANT CHANGE UP (ref 3.8–5.2)
RBC # FLD: 13.3 % — SIGNIFICANT CHANGE UP (ref 10.3–14.5)
SODIUM SERPL-SCNC: 135 MMOL/L — SIGNIFICANT CHANGE UP (ref 135–145)
WBC # BLD: 5.01 K/UL — SIGNIFICANT CHANGE UP (ref 3.8–10.5)
WBC # FLD AUTO: 5.01 K/UL — SIGNIFICANT CHANGE UP (ref 3.8–10.5)

## 2023-05-31 PROCEDURE — 83036 HEMOGLOBIN GLYCOSYLATED A1C: CPT

## 2023-05-31 PROCEDURE — 99239 HOSP IP/OBS DSCHRG MGMT >30: CPT | Mod: GC

## 2023-05-31 PROCEDURE — 85025 COMPLETE CBC W/AUTO DIFF WBC: CPT

## 2023-05-31 PROCEDURE — 84100 ASSAY OF PHOSPHORUS: CPT

## 2023-05-31 PROCEDURE — 80053 COMPREHEN METABOLIC PANEL: CPT

## 2023-05-31 PROCEDURE — 80048 BASIC METABOLIC PNL TOTAL CA: CPT

## 2023-05-31 PROCEDURE — 99285 EMERGENCY DEPT VISIT HI MDM: CPT | Mod: 25

## 2023-05-31 PROCEDURE — 93458 L HRT ARTERY/VENTRICLE ANGIO: CPT | Mod: 26,59

## 2023-05-31 PROCEDURE — 93306 TTE W/DOPPLER COMPLETE: CPT

## 2023-05-31 PROCEDURE — 84443 ASSAY THYROID STIM HORMONE: CPT

## 2023-05-31 PROCEDURE — 83690 ASSAY OF LIPASE: CPT

## 2023-05-31 PROCEDURE — 83735 ASSAY OF MAGNESIUM: CPT

## 2023-05-31 PROCEDURE — 84484 ASSAY OF TROPONIN QUANT: CPT

## 2023-05-31 PROCEDURE — 81001 URINALYSIS AUTO W/SCOPE: CPT

## 2023-05-31 PROCEDURE — 80061 LIPID PANEL: CPT

## 2023-05-31 PROCEDURE — 93010 ELECTROCARDIOGRAM REPORT: CPT

## 2023-05-31 PROCEDURE — 83880 ASSAY OF NATRIURETIC PEPTIDE: CPT

## 2023-05-31 PROCEDURE — 85027 COMPLETE CBC AUTOMATED: CPT

## 2023-05-31 PROCEDURE — 92928 PRQ TCAT PLMT NTRAC ST 1 LES: CPT | Mod: RC

## 2023-05-31 PROCEDURE — 36415 COLL VENOUS BLD VENIPUNCTURE: CPT

## 2023-05-31 PROCEDURE — 93005 ELECTROCARDIOGRAM TRACING: CPT

## 2023-05-31 PROCEDURE — 71045 X-RAY EXAM CHEST 1 VIEW: CPT

## 2023-05-31 RX ORDER — LOSARTAN POTASSIUM 100 MG/1
1 TABLET, FILM COATED ORAL
Refills: 0 | DISCHARGE

## 2023-05-31 RX ORDER — AMLODIPINE BESYLATE 2.5 MG/1
1 TABLET ORAL
Refills: 0 | DISCHARGE

## 2023-05-31 RX ORDER — HYDROCHLOROTHIAZIDE 25 MG
1 TABLET ORAL
Refills: 0 | DISCHARGE

## 2023-05-31 RX ORDER — ASPIRIN/CALCIUM CARB/MAGNESIUM 324 MG
81 TABLET ORAL DAILY
Refills: 0 | Status: DISCONTINUED | OUTPATIENT
Start: 2023-06-01 | End: 2023-06-01

## 2023-05-31 RX ORDER — ASPIRIN/CALCIUM CARB/MAGNESIUM 324 MG
1 TABLET ORAL
Refills: 0 | DISCHARGE

## 2023-05-31 RX ORDER — LOSARTAN POTASSIUM 100 MG/1
1 TABLET, FILM COATED ORAL
Qty: 0 | Refills: 0 | DISCHARGE

## 2023-05-31 RX ORDER — HEPARIN SODIUM 5000 [USP'U]/ML
5000 INJECTION INTRAVENOUS; SUBCUTANEOUS EVERY 12 HOURS
Refills: 0 | Status: DISCONTINUED | OUTPATIENT
Start: 2023-06-01 | End: 2023-06-01

## 2023-05-31 RX ORDER — METOPROLOL TARTRATE 50 MG
1 TABLET ORAL
Qty: 0 | Refills: 0 | DISCHARGE

## 2023-05-31 RX ORDER — GABAPENTIN 400 MG/1
1 CAPSULE ORAL
Qty: 0 | Refills: 0 | DISCHARGE

## 2023-05-31 RX ORDER — ATORVASTATIN CALCIUM 80 MG/1
40 TABLET, FILM COATED ORAL AT BEDTIME
Refills: 0 | Status: DISCONTINUED | OUTPATIENT
Start: 2023-05-31 | End: 2023-06-01

## 2023-05-31 RX ORDER — ALENDRONATE SODIUM 70 MG/1
0 TABLET ORAL
Qty: 0 | Refills: 0 | DISCHARGE

## 2023-05-31 RX ORDER — METOPROLOL TARTRATE 50 MG
1 TABLET ORAL
Refills: 0 | DISCHARGE

## 2023-05-31 RX ORDER — ALENDRONATE SODIUM 70 MG/1
1 TABLET ORAL
Refills: 0 | DISCHARGE

## 2023-05-31 RX ORDER — AMLODIPINE BESYLATE 2.5 MG/1
5 TABLET ORAL DAILY
Refills: 0 | Status: DISCONTINUED | OUTPATIENT
Start: 2023-05-31 | End: 2023-06-01

## 2023-05-31 RX ORDER — SODIUM CHLORIDE 9 MG/ML
500 INJECTION INTRAMUSCULAR; INTRAVENOUS; SUBCUTANEOUS
Refills: 0 | Status: DISCONTINUED | OUTPATIENT
Start: 2023-05-31 | End: 2023-06-01

## 2023-05-31 RX ORDER — HYDROCHLOROTHIAZIDE 25 MG
1 TABLET ORAL
Qty: 0 | Refills: 0 | DISCHARGE

## 2023-05-31 RX ORDER — SIMVASTATIN 20 MG/1
1 TABLET, FILM COATED ORAL
Qty: 0 | Refills: 0 | DISCHARGE

## 2023-05-31 RX ORDER — LOSARTAN POTASSIUM 100 MG/1
100 TABLET, FILM COATED ORAL DAILY
Refills: 0 | Status: DISCONTINUED | OUTPATIENT
Start: 2023-05-31 | End: 2023-06-01

## 2023-05-31 RX ORDER — SODIUM CHLORIDE 9 MG/ML
3 INJECTION INTRAMUSCULAR; INTRAVENOUS; SUBCUTANEOUS EVERY 8 HOURS
Refills: 0 | Status: DISCONTINUED | OUTPATIENT
Start: 2023-05-31 | End: 2023-06-01

## 2023-05-31 RX ORDER — CLOPIDOGREL BISULFATE 75 MG/1
75 TABLET, FILM COATED ORAL DAILY
Refills: 0 | Status: DISCONTINUED | OUTPATIENT
Start: 2023-06-01 | End: 2023-06-01

## 2023-05-31 RX ORDER — METOPROLOL TARTRATE 50 MG
25 TABLET ORAL
Refills: 0 | Status: DISCONTINUED | OUTPATIENT
Start: 2023-05-31 | End: 2023-06-01

## 2023-05-31 RX ADMIN — SODIUM CHLORIDE 3 MILLILITER(S): 9 INJECTION INTRAMUSCULAR; INTRAVENOUS; SUBCUTANEOUS at 22:28

## 2023-05-31 RX ADMIN — Medication 81 MILLIGRAM(S): at 11:55

## 2023-05-31 RX ADMIN — Medication 25 MILLIGRAM(S): at 18:08

## 2023-05-31 RX ADMIN — ATORVASTATIN CALCIUM 40 MILLIGRAM(S): 80 TABLET, FILM COATED ORAL at 22:32

## 2023-05-31 RX ADMIN — LOSARTAN POTASSIUM 100 MILLIGRAM(S): 100 TABLET, FILM COATED ORAL at 22:32

## 2023-05-31 RX ADMIN — ENOXAPARIN SODIUM 40 MILLIGRAM(S): 100 INJECTION SUBCUTANEOUS at 05:54

## 2023-05-31 RX ADMIN — SODIUM CHLORIDE 75 MILLILITER(S): 9 INJECTION INTRAMUSCULAR; INTRAVENOUS; SUBCUTANEOUS at 17:34

## 2023-05-31 RX ADMIN — LOSARTAN POTASSIUM 100 MILLIGRAM(S): 100 TABLET, FILM COATED ORAL at 05:54

## 2023-05-31 RX ADMIN — SODIUM CHLORIDE 3 MILLILITER(S): 9 INJECTION INTRAMUSCULAR; INTRAVENOUS; SUBCUTANEOUS at 13:43

## 2023-05-31 RX ADMIN — Medication 25 MILLIGRAM(S): at 05:54

## 2023-05-31 RX ADMIN — SODIUM CHLORIDE 75 MILLILITER(S): 9 INJECTION INTRAMUSCULAR; INTRAVENOUS; SUBCUTANEOUS at 18:45

## 2023-05-31 RX ADMIN — SODIUM CHLORIDE 75 MILLILITER(S): 9 INJECTION INTRAMUSCULAR; INTRAVENOUS; SUBCUTANEOUS at 18:08

## 2023-05-31 RX ADMIN — AMLODIPINE BESYLATE 5 MILLIGRAM(S): 2.5 TABLET ORAL at 18:08

## 2023-05-31 NOTE — ACUTE INTERFACILITY TRANSFER NOTE - HOSPITAL COURSE
87 year old, female PMH HTN, HLD, arthritis, osteoporosis , and CAD who came in complaining of gradual onset, stable, intermittent, diffuse chest pain for 1 week. She presented to the ED due to high blood pressure associated with bilateral arm pain L>>R and dizziness.  She was admitted to [facility] for ACS workup.  Patient was hemodynamically stable with a Blood pressure on admission of 175/84.  Patient have negative troponin x2.  Patient had EKG showing NSR.  Patient was evaluated by cardiology Dr. Weiss.  Patient had an echocardiogram which revealed a reduced EF of 30-35%.  Patient was continued on their home blood pressure meds and their blood pressure was well controlled while in the hospital. Patient will be transfered to [facility] for cardiac cath

## 2023-05-31 NOTE — PATIENT PROFILE ADULT - MST SCORE
DISPLAY PLAN FREE TEXT
0

## 2023-05-31 NOTE — DISCHARGE NOTE PROVIDER - NSDCHOSPICE_GEN_A_CORE
How to Stop Smoking   WHAT YOU NEED TO KNOW:   You will improve your health and the health of others around you if you stop smoking  Your risk for heart and lung disease, cancer, stroke, heart attack, and vision problems will also decrease  You can benefit from quitting no matter how long you have smoked  DISCHARGE INSTRUCTIONS:   Prepare to stop smoking:  Nicotine is a highly addictive drug found in cigarettes  Withdrawal symptoms can happen when you stop smoking and make it hard to quit  These include anxiety, depression, irritability, trouble sleeping, and increased appetite  You increase your chances of success if you prepare to quit  · Set a quit date  Stewart [place] a date that is within the next 2 weeks  Do not pick a day that you think may be stressful or busy  Write down the day or [place] it on your calender  · Tell friends and family that you plan to quit  Explain that you may have withdrawal symptoms when you try to quit  Ask them to support you  They may be able to encourage you and help reduce your stress to make it easier for you to quit  · Make a list of your reasons for quitting  Put the list somewhere you will see it every day, such as your refrigerator  You can look at the list when you have a craving  · Remove all tobacco and nicotine products from your home, car, and workplace  Also, remove anything else that will tempt you to smoke, such as lighters, matches, or ashtrays  Clean your car, home, and places at work that smell like smoke  The smell of smoke can trigger a craving  · Identify triggers that make you want to smoke  This may include activities, feelings, or people  Also write down 1 way you can deal with each of your triggers  For example, if you want to smoke as soon as you wake up, plan another activity during this time, such as exercise  · Make a plan for how you will quit    Learn about the tools that can help you quit, such as medicine, counseling, or nicotine replacement therapy  Choose at least 2 options to help you quit  Tools to help you stop smoking:   · Counseling  from a trained healthcare provider can provide you with support and skills to quit smoking  The provider will also teach you to manage your withdrawal symptoms and cravings  You may receive counseling from one counselor, in group therapy, or through phone therapy called a quit line  · Nicotine replacement therapy (NRT)  such as nicotine patches, gum, or lozenges may help reduce your nicotine cravings  You may get these without a doctor's order  Do not use e-cigarettes or smokeless tobacco in place of cigarettes or to help you quit  They still contain nicotine  · Prescription medicines  such as nasal sprays or nicotine inhalers may help reduce your withdrawal symptoms  Other medicines may also be used to reduce your urge to smoke  Ask your healthcare provider about these medicines  You may need to start certain medicines 2 weeks before your quit date for them to work well  · Hypnosis  is a practice that helps guide you through thoughts and feelings  Hypnosis may help decrease your cravings and make you more willing to quit  · Acupuncture therapy  uses very thin needles to balance energy channels in the body  This is thought to help decrease cravings and symptoms of nicotine withdrawal      · Support groups  let you talk to others who are trying to quit or have already quit  It may be helpful to speak with others about how they quit  Manage your cravings:   · Avoid situations, people, and places that tempt you to smoke  Go to nonsmoking places, such as libraries or restaurants  Understand what tempts you and try to avoid these things  · Keep your hands busy  Hold things such as a stress ball or pen  · Put candy or toothpicks in your mouth  Keep lollipops, sugarless gum, or toothpicks with you at all times  · Do not have alcohol or caffeine    These drinks may tempt you to smoke  Drink healthy liquids such as water or juice instead  · Reward yourself when you resist your cravings  Rewards will motivate you and help you stay positive  · Do an activity that distracts you from your craving  Examples include going for a walk, exercising, or cleaning  Prevent weight gain after you quit:  You may gain a few pounds after you quit smoking  It is healthier for you to gain a few pounds than to continue to smoke  The following can help you prevent weight gain:  · Eat healthy foods  These include fruits, vegetables, whole-grain breads, low-fat dairy products, beans, lean meats, and fish  Eat healthy snacks, such as low-fat yogurt, if you get hungry between meals  · Drink water before, during, and between meals  This will make your stomach feel full and help prevent you from overeating  Ask your healthcare provider how much liquid to drink each day and which liquids are best for you  · Exercise  Take a walk or do some kind of exercise every day  Ask your healthcare provider what exercise is right for you  This may help reduce your cravings and reduce stress  For support and more information:   · Smokefree  gov  Phone: 1- 285 - 006-4770  Web Address: www smokefree  gov  © 2017 [address] Information is for End User's use only and may not be sold, redistributed or otherwise used for commercial purposes  All illustrations and images included in CareNotes® are the copyrighted property of [employer]  or Dk Rodríguez  The above information is an  only  It is not intended as medical advice for individual conditions or treatments  Talk to your doctor, nurse or pharmacist before following any medical regimen to see if it is safe and effective for you  No

## 2023-05-31 NOTE — PROGRESS NOTE ADULT - SUBJECTIVE AND OBJECTIVE BOX
PATIENT SEEN AND EXAMINED ON :- 5/31/23  DATE OF SERVICE:   5/31/23          Interim events noted,Labs ,Radiological studies and Cardiology tests reviewed .    MR#[id number]  PATIENT NAME:-VISHNU [facility] COURSE: HPI:  88 y/o F w/ PMH of HTN, HLD, arthritis, osteoporosis and CAD presented to [facility] ED with complaint of high blood pressure associated with bilateral arm pain L>>R and dizziness. Pt also admits to intermittent chest pain for the past several months with most recent episode happening about 2 weeks ago. Pt also admits to exertional fatigue and shortness of breath mainly when climbing stairs. Patient have negative troponin x2. Patient was evaluated by cardiology Dr. Weiss. Patient had an echocardiogram which revealed a reduced LV systolic EF of 30-35%. Pt now transferred to [facility] for cardiac catheretization. (31 May 2023 14:35)      INTERIM EVENTS:Patient seen at bedside ,interim events noted.      PMH -reviewed admission note, no change since admission  HEART FAILURE: Acute[ ]Chronic[ ] Systolic[ ] Diastolic[ ] Combined Systolic and Diastolic[ ]  CAD[ ] CABG[ ] PCI[ ]  DEVICES[ ] PPM[ ] ICD[ ] ILR[ ]  ATRIAL FIBRILLATION[ ] Paroxysmal[ ] Permanent[ ] CHADS2-[  ]  EVAN[ ] CKD1[ ] CKD2[ ] CKD3[ ] CKD4[ ] ESRD[ ]  COPD[ ] HTN[ ]   DM[ ] Type1[ ] Type 2[ ]   CVA[ ] Paresis[ ]    AMBULATION: Assisted[ ] Cane/walker[ ] Independent[ ]    MEDICATIONS  (STANDING):  amLODIPine   Tablet 5 milliGRAM(s) Oral daily  atorvastatin 40 milliGRAM(s) Oral at bedtime  hydrochlorothiazide 25 milliGRAM(s) Oral daily  losartan 100 milliGRAM(s) Oral daily  metoprolol tartrate 25 milliGRAM(s) Oral two times a day  sodium chloride 0.9% lock flush 3 milliLiter(s) IV Push every 8 hours  sodium chloride 0.9%. 500 milliLiter(s) (75 mL/Hr) IV Continuous <Continuous>    MEDICATIONS  (PRN):            REVIEW OF SYSTEMS:  Constitutional: [ ] fever, [ ]weight loss,  [ ]fatigue [ ]weight gain  Eyes: [ ] visual changes  Respiratory: [ ]shortness of breath;  [ ] cough, [ ]wheezing, [ ]chills, [ ]hemoptysis  Cardiovascular: [ ] chest pain, [ ]palpitations, [ ]dizziness,  [ ]leg swelling[ ]orthopnea[ ]PND  Gastrointestinal: [ ] abdominal pain, [ ]nausea, [ ]vomiting,  [ ]diarrhea [ ]Constipation [ ]Melena  Genitourinary: [ ] dysuria, [ ] hematuria [ ][place]  Neurologic: [ ] headaches [ ] tremors[ ]weakness [ ]Paralysis Right[ ] Left[ ]  Skin: [ ] itching, [ ]burning, [ ] rashes  Endocrine: [ ] heat or cold intolerance  Musculoskeletal: [ ] joint pain or swelling; [ ] muscle, back, or extremity pain  Psychiatric: [ ] depression, [ ]anxiety, [ ]mood swings, or [ ]difficulty sleeping  Hematologic: [ ] easy bruising, [ ] bleeding gums    [ ] All remaining systems negative except as per above.   [ ]Unable to obtain.  [x] No change in ROS since admission      Vital Signs Last 24 Hrs  T(C): 36.6 (31 May 2023 20:28), Max: 36.6 (31 May 2023 20:28)  T(F): 97.9 (31 May 2023 20:28), Max: 97.9 (31 May 2023 20:28)  HR: 65 (31 May 2023 20:28) (60 - 84)  BP: 159/85 (31 May 2023 20:28) (122/78 - 159/85)  BP(mean): --  RR: 17 (31 May 2023 20:28) (17 - 18)  SpO2: 100% (31 May 2023 20:28) (93% - 100%)      I&O's Summary      PHYSICAL EXAM:  General: No acute distress BMI-  HEENT: EOMI, PERRL  Neck: Supple, [ ] JVD  Lungs: Equal air entry bilaterally; [ ] rales [ ] wheezing [ ] rhonchi  Heart: Regular rate and rhythm; [x ] murmur   2/6 [ x] systolic [ ] diastolic [ ] radiation[ ] rubs [ ]  gallops  Abdomen: Nontender, bowel sounds present  Extremities: No clubbing, cyanosis, [ ] edema [ ]Pulses  equal and intact  Nervous system:  Alert & Oriented X3, no focal deficits  Psychiatric: Normal affect  Skin: No rashes or lesions    LABS:  05-31    135  |  104  |  20<H>  ----------------------------<  95  3.8   |  25  |  0.65    Ca    8.9      31 May 2023 06:01  Phos  3.5     05-30  Mg     2.1     05-30    TPro  6.6  /  Alb  3.2<L>  /  TBili  0.4  /  DBili  x   /  AST  14  /  ALT  20  /  AlkPhos  60  05-30    Creatinine Trend: 0.65<--, 0.85<--, 0.64<--                        12.6   5.01  )-----------( 161      ( 31 May 2023 06:01 )             36.4         Patient name: VISHNU BERMAN  YOB: 1935   Age: 87 (F)   MR#: [id number]  Study Date: 5/30/2023  Location: [address] [place], [place] [address]ographer: Hernan Padilla [facility]  Study quality: Technically difficult  Referring Physician:  KATY SORIANO MD  Blood Pressure: 135/65 mmHg  Height: 158 cm  Weight: 55 kg  BSA: 1.5 m2  ------------------------------------------------------------------------    PROCEDURE: Transthoracic echocardiogram with 2-D, M-Mode  and complete spectral and color flow Doppler.  Verbalconsent was obtained for injection of ultrasound  enhancing agent  following a discussion of risks and  benefits. Following intravenous injection of ultrasound  enhancing agent, harmonic imaging was performed. 1.5_cc of  ultrasound enhancing agent injected intravenously.  INDICATION: Cardiomyopathy, unspecified (I42.9)  HISTORY:  ------------------------------------------------------------------------  DIMENSIONS:  Dimensions:     Normal Values:  LA:     3.3 cm    2.0 - 4.0 cm  Ao:     3.0 cm    2.0 - 3.8 cm  SEPTUM: 0.9 cm    0.6 - 1.2 cm  PWT:    0.9 cm    0.6 - 1.1 cm  LVIDd:  4.5 cm    3.0 - 5.6 cm  LVIDs:  3.7 cm    1.8 - 4.0 cm      Derived Variables:  LVMI: 86 g/m2  RWT: 0.40  Ejection Fraction Visual Estimate: 30-35 %    ------------------------------------------------------------------------  OBSERVATIONS:  Mitral Valve: Mitral annular calcification. Mild mitral  regurgitation.  Aortic Root: Normal aortic root.  Aortic Valve: Aortic valve leaflet morphology not well  visualized, opens normally.  Left Atrium: Normal left atrium.  Left Ventricle: Moderate global left ventricular systolic  dysfunction. Ultrasound LV opacification agent was used to  enhance endocardial definition. Normal left ventricular  internal dimensions and wall thicknesses.  Right Heart: Right ventricle not well visualized. There is  mild tricuspid regurgitation. There is trace pulmonic  regurgitation.  Pericardium/PleuraNormal pericardium with no pericardial  effusion.  Hemodynamic: RA Pressure is 10 mm Hg. RV systolic pressure  is 35 mm Hg.  ------------------------------------------------------------------------  CONCLUSIONS:  TECHNICALLY VERY DIFFICULT STUDY, POOR ACOUSTIC WINDOWS    1. Mitral annular calcification. Mild mitral regurgitation.    2. Normal left ventricular internal dimensions and wall  thicknesses.  3. Moderate global left ventricular systolic dysfunction.  Ultrasound LV opacification agent was used to enhance  endocardial definition.  4. Right ventricle not well visualized.    ------------------------------------------------------------------------  Confirmed on  5/30/2023 - 20:47:20 raulito Hill MD  ------------------------------------------------------------------------

## 2023-05-31 NOTE — TRANSFER ACCEPTANCE NOTE - NEGATIVE NEUROLOGICAL SYMPTOMS
no transient paralysis/no weakness/no syncope/no vertigo/no headache/no loss of consciousness/no hemiparesis/no confusion

## 2023-05-31 NOTE — DISCHARGE NOTE PROVIDER - HOSPITAL COURSE
87 year old, female PMH HTN, HLD, arthritis, osteoporosis , and CAD who came in complaining of gradual onset, stable, intermittent, diffuse chest pain for 1 week. She presented to the ED due to high blood pressure associated with bilateral arm pain L>>R and dizziness.  She was admitted to [facility] for ACS workup.  Patient was hemodynamically stable with a Blood pressure on admission of 175/84.  Patient have negative troponin x2.  Patient had EKG showing NSR.  Patient was evaluated by cardiology Dr. Weiss.  Patient had an echocardiogram which revealed an EF of 30-35%.  Patient was continued on their home blood pressure meds and their blood pressure was well controlled while in the hospital.    Patient is able to ambulate and tolerate diet prior to discharge. Patient is stable for discharge per attending and is advised to follow up with PCP as outpatient. Please refer to patient's complete medical chart with documents for a full hospital course, for this is only a brief summary.

## 2023-05-31 NOTE — PROGRESS NOTE ADULT - TIME BILLING
- Review of records, telemetry, vital signs and daily labs.   - General and cardiovascular physical examination.  - Generation of cardiovascular treatment plan.  - Coordination of care.      Patient was seen and examined by me on 5/31/23,interim events noted,labs and radiology studies reviewed.  Freddy Weiss MD,FACC.  [address] [place], [place] [id number].  [phone]

## 2023-05-31 NOTE — TRANSFER ACCEPTANCE NOTE - NSICDXPASTMEDICALHX_GEN_ALL_CORE_FT
PAST MEDICAL HISTORY:  Arthritis     HLD (hyperlipidemia)     HTN (hypertension)     Osteoporosis

## 2023-05-31 NOTE — TRANSFER ACCEPTANCE NOTE - HISTORY OF PRESENT ILLNESS
88 y/o F w/ PMH of HTN, HLD, arthritis, osteoporosis and CAD presented to [facility] ED with complaint of high blood pressure associated with bilateral arm pain L>>R and dizziness. Pt also admits to intermittent chest pain for the past several months with most recent episode happening about 2 weeks ago. Pt also admits to exertional fatigue and shortness of breath mainly when climbing stairs. Patient have negative troponin x2. Patient was evaluated by cardiology Dr. Weiss. Patient had an echocardiogram which revealed a reduced LV systolic EF of 30-35%. Pt now transferred to [facility] for cardiac catheretization.

## 2023-05-31 NOTE — PROGRESS NOTE ADULT - SUBJECTIVE AND OBJECTIVE BOX
PGY-1 Progress Note discussed with attending      PLEASE CONTACT ON CALL TEAM:  - On Call Team (Please refer to Tyson) FROM 5:00 PM - 8:30PM  - Nightfloat Team FROM 8:30 -7:30 AM    INTERVAL HPI/OVERNIGHT EVENTS:       REVIEW OF SYSTEMS:  CONSTITUTIONAL: No fever, weight loss, or fatigue  RESPIRATORY: No cough, wheezing, chills or hemoptysis; No shortness of breath  CARDIOVASCULAR: No chest pain, palpitations, dizziness, or leg swelling  GASTROINTESTINAL: No abdominal pain. No nausea, vomiting, or hematemesis; No diarrhea or constipation. No melena or hematochezia.  GENITOURINARY: No dysuria or hematuria, urinary frequency  NEUROLOGICAL: No headaches, memory loss, loss of strength, numbness, or tremors  SKIN: No itching, burning, rashes, or lesions     MEDICATIONS  (STANDING):  aspirin  chewable 81 milliGRAM(s) Oral daily  atorvastatin 40 milliGRAM(s) Oral at bedtime  enoxaparin Injectable 40 milliGRAM(s) SubCutaneous every 24 hours  gabapentin 100 milliGRAM(s) Oral at bedtime  hydrochlorothiazide 25 milliGRAM(s) Oral daily  losartan 100 milliGRAM(s) Oral daily  metoprolol tartrate 25 milliGRAM(s) Oral two times a day    MEDICATIONS  (PRN):  acetaminophen     Tablet .. 650 milliGRAM(s) Oral every 6 hours PRN Temp greater or equal to 38C (100.4F), Mild Pain (1 - 3)  aluminum hydroxide/magnesium hydroxide/simethicone Suspension 30 milliLiter(s) Oral every 4 hours PRN Dyspepsia  melatonin 3 milliGRAM(s) Oral at bedtime PRN Insomnia  ondansetron Injectable 4 milliGRAM(s) IV Push every 8 hours PRN Nausea and/or Vomiting      Vital Signs Last 24 Hrs  T(C): 36.3 (31 May 2023 05:09), Max: 37.1 (30 May 2023 11:20)  T(F): 97.3 (31 May 2023 05:09), Max: 98.8 (30 May 2023 11:20)  HR: 66 (31 May 2023 05:09) (60 - 75)  BP: 142/63 (31 May 2023 05:09) (123/63 - 166/73)  BP(mean): --  RR: 18 (31 May 2023 05:09) (18 - 19)  SpO2: 98% (31 May 2023 05:09) (97% - 100%)    Parameters below as of 31 May 2023 05:09  Patient On (Oxygen Delivery Method): room air        PHYSICAL EXAMINATION:  GENERAL: NAD, well built  HEAD:  Atraumatic, Normocephalic  EYES:  conjunctiva and sclera clear  NECK: Supple, No JVD, Normal thyroid  CHEST/LUNG: Clear to auscultation. Clear to percussion bilaterally; No rales, rhonchi, wheezing, or rubs  HEART: Regular rate and rhythm; No murmurs, rubs, or gallops  ABDOMEN: Soft, Nontender, Nondistended; Bowel sounds present, no pain or masses on palpation  NERVOUS SYSTEM:  Alert & Oriented X3  : voiding well  EXTREMITIES:  2+ Peripheral Pulses, No clubbing, cyanosis, or edema  SKIN: warm dry                          12.6   5.01  )-----------( 161      ( 31 May 2023 06:01 )             36.4     05-30    134<L>  |  104  |  21<H>  ----------------------------<  104<H>  3.8   |  25  |  0.85    Ca    9.1      30 May 2023 06:10  Phos  3.5     05-30  Mg     2.1     05-30    TPro  6.6  /  Alb  3.2<L>  /  TBili  0.4  /  DBili  x   /  AST  14  /  ALT  20  /  AlkPhos  60  05-30    LIVER FUNCTIONS - ( 30 May 2023 06:10 )  Alb: 3.2 g/dL / Pro: 6.6 g/dL / ALK PHOS: 60 U/L / ALT: 20 U/L DA / AST: 14 U/L / GGT: x                   I&O's Summary          CAPILLARY BLOOD GLUCOSE      RADIOLOGY & ADDITIONAL TESTS:                   PGY-1 Progress Note discussed with attending      PLEASE CONTACT ON CALL TEAM:  - On Call Team (Please refer to Tyson) FROM 5:00 PM - 8:30PM  - Nightfloat Team FROM 8:30 -7:30 AM    INTERVAL HPI/OVERNIGHT EVENTS: No acute events overnight.  Patient examined at bedside this AM.  Patient denies acute complaints.  Patients echo showing new onset CHF with EF of 30-35%.  Discussed with patient with Pakistani interpritters (ID: [id number], Name: Ashley, ID: [id number], Name: Jill) as well as with Senior resident Dr. Bonds in Pakistani.  Patient understand and agrees with the plan to transfer to [facility] for cardiac cath.  Patient denies chest pain, nausea, vomiting, shortness of breath.      REVIEW OF SYSTEMS:  CONSTITUTIONAL: No fever, weight loss, or fatigue  RESPIRATORY: No cough, wheezing, chills or hemoptysis; No shortness of breath  CARDIOVASCULAR: No chest pain, palpitations, dizziness, or leg swelling  GASTROINTESTINAL: No abdominal pain. No nausea, vomiting, or hematemesis; No diarrhea or constipation. No melena or hematochezia.  GENITOURINARY: No dysuria or hematuria, urinary frequency  NEUROLOGICAL: No headaches, memory loss, loss of strength, numbness, or tremors  SKIN: No itching, burning, rashes, or lesions     MEDICATIONS  (STANDING):  aspirin  chewable 81 milliGRAM(s) Oral daily  atorvastatin 40 milliGRAM(s) Oral at bedtime  enoxaparin Injectable 40 milliGRAM(s) SubCutaneous every 24 hours  gabapentin 100 milliGRAM(s) Oral at bedtime  hydrochlorothiazide 25 milliGRAM(s) Oral daily  losartan 100 milliGRAM(s) Oral daily  metoprolol tartrate 25 milliGRAM(s) Oral two times a day    MEDICATIONS  (PRN):  acetaminophen     Tablet .. 650 milliGRAM(s) Oral every 6 hours PRN Temp greater or equal to 38C (100.4F), Mild Pain (1 - 3)  aluminum hydroxide/magnesium hydroxide/simethicone Suspension 30 milliLiter(s) Oral every 4 hours PRN Dyspepsia  melatonin 3 milliGRAM(s) Oral at bedtime PRN Insomnia  ondansetron Injectable 4 milliGRAM(s) IV Push every 8 hours PRN Nausea and/or Vomiting      Vital Signs Last 24 Hrs  T(C): 36.3 (31 May 2023 05:09), Max: 37.1 (30 May 2023 11:20)  T(F): 97.3 (31 May 2023 05:09), Max: 98.8 (30 May 2023 11:20)  HR: 66 (31 May 2023 05:09) (60 - 75)  BP: 142/63 (31 May 2023 05:09) (123/63 - 166/73)  BP(mean): --  RR: 18 (31 May 2023 05:09) (18 - 19)  SpO2: 98% (31 May 2023 05:09) (97% - 100%)    Parameters below as of 31 May 2023 05:09  Patient On (Oxygen Delivery Method): room air        PHYSICAL EXAMINATION:  GENERAL: NAD, lying in bed comfortably  HEAD:  Atraumatic, Normocephalic  EYES: EOMI, PERRLA, conjunctiva and sclera clear  ENT: Moist mucous membranes  NECK: Supple, No JVD  CHEST/LUNG: Clear to auscultation bilaterally; No rales, rhonchi, wheezing, or rubs. Unlabored respirations  HEART: Regular rate and rhythm; No murmurs, rubs, or gallops  ABDOMEN: Bowel sounds present; Soft, Nontender, Nondistended. No hepatomegally  EXTREMITIES:  2+ Peripheral Pulses, brisk capillary refill. No clubbing, cyanosis, or edema  NERVOUS SYSTEM:  Alert & Oriented X3, speech clear. No deficits   MSK: FROM all 4 extremities, full and equal strength  SKIN: No rashes or lesions                          12.6   5.01  )-----------( 161      ( 31 May 2023 06:01 )             36.4     05-30    134<L>  |  104  |  21<H>  ----------------------------<  104<H>  3.8   |  25  |  0.85    Ca    9.1      30 May 2023 06:10  Phos  3.5     05-30  Mg     2.1     05-30    TPro  6.6  /  Alb  3.2<L>  /  TBili  0.4  /  DBili  x   /  AST  14  /  ALT  20  /  AlkPhos  60  05-30    LIVER FUNCTIONS - ( 30 May 2023 06:10 )  Alb: 3.2 g/dL / Pro: 6.6 g/dL / ALK PHOS: 60 U/L / ALT: 20 U/L DA / AST: 14 U/L / GGT: x

## 2023-05-31 NOTE — PROGRESS NOTE ADULT - PROBLEM SELECTOR PLAN 2
Pt states she has had arm and chest pain   In the setting of high  BP in the 180s, patient states that in the past, she has not had any severe symptoms even with BP in the 200s    -BP in the ED was 170s and improved  -Pain is also improved  -purple discoloration during epidsodes, likely due to vasoconstriction   -used to take amlodipine last year, now on HCTZ   - patient bp improved today to 133/68  - continue to monitor bp presented with arm pain, associated with chest pain   found to have EKG with sinus no lorenzo   trop wnl  Pain happened at rest:- unstable chest pain   now admitted to rule out ACS  no caffeine  f/u lipid panel, A1c, TSH  f/u Echo  D/C tele  patient without chest pain  cardiology Bola Weiss presented with arm pain, associated with chest pain   found to have EKG with sinus no lorenzo   trop wnl  Pain happened at rest:- unstable chest pain   no caffeine  lipid panel noted  A1c 6.3  TSH 1.66  Echo with ef of 30-35%  patient without chest pain  cardiology Bola Weiss

## 2023-05-31 NOTE — PROGRESS NOTE ADULT - PROBLEM SELECTOR PLAN 1
presented with arm pain, associated with chest pain   found to have EKG with sinus no lorenzo   trop wnl  Pain happened at rest:- unstable chest pain   now admitted to rule out ACS  no caffeine  f/u lipid panel, A1c, TSH  f/u Echo  D/C tele  patient without chest pain  cardiology Bola Weiss - Patient with no history of CHF  - Patient with history of MI - Patient with no history of CHF  - Patient with history of MI in the past  - Patient not currently with fluid overload  - Patient Echo showing EF of 30-35%  - Cardiology Dr. Weiss consulted  - Patient for transfer to cardiac cath at [facility]

## 2023-05-31 NOTE — PROGRESS NOTE ADULT - ASSESSMENT
87 year old, female PMH HLD, HTN, arthritis, osteoporosis , CAD comes in complaining of gradual onset, stable, intermittent, diffuse chest pain last week. Came to the ED today due to high blood pressure associated with bilateral arm pain L>>R admitted to rule out ACS.
87 year old, female PMH HLD, HTN, arthritis, osteoporosis , CAD comes in complaining of gradual onset, stable, intermittent, diffuse chest pain last week. Came to the ED today due to high blood pressure associated with bilateral arm pain L>>R admitted to rule out ACS.

## 2023-05-31 NOTE — CHART NOTE - NSCHARTNOTEFT_GEN_A_CORE
Patient is s/p cardiac cath. Right radial cath site dressing clean, dry and intact. Ecchymosis noted on the lateral border of the dressing. On exam area noted to be soft and non tender, marked to monitor expansion. No active bleeding or hematoma noted. Positive 2 + radial pulse. Will continue to monitor.

## 2023-05-31 NOTE — PROGRESS NOTE ADULT - PROBLEM SELECTOR PLAN 3
On simvastatin and asa and beta blocker as well as losartan   c/w home meds Pt states she has had arm and chest pain   In the setting of high  BP in the 180s, patient states that in the past, she has not had any severe symptoms even with BP in the 200s    -BP in the ED was 170s and improved  -Pain is also improved  -purple discoloration during epidsodes, likely due to vasoconstriction   -used to take amlodipine last year, now on HCTZ   - patient bp improved today to 133/68  - continue to monitor bp - BP in the ED was 170s and improved  - purple discoloration during epidsodes, likely due to vasoconstriction   - used to take amlodipine last year, now on HCTZ   - patient bp improved today to 122/78  - continue to monitor bp

## 2023-05-31 NOTE — PROGRESS NOTE ADULT - ASSESSMENT
88 y/o F w/ PMH of HTN, HLD, arthritis, osteoporosis and CAD presented to [facility]placed by [facility] ED with complaint of high blood pressure associated with bilateral arm pain L>>R and dizziness    + LV dysfunction  + chest pain

## 2023-05-31 NOTE — DISCHARGE NOTE PROVIDER - NSDCCPCAREPLAN_GEN_ALL_CORE_FT
PRINCIPAL DISCHARGE DIAGNOSIS  Diagnosis: Hypertensive emergency  Assessment and Plan of Treatment: You came into the hospital after you had an episode of high blood pressure to over s Sistolic blood pressure of 180.  You had dizziness and mild chest pressure with your hypertensive episode.  You had an extensive cardiac workup which showed negative troponins and a normal ekg.  You had an echo which showed a reduced EF to 30-35%.  You were evaluated by Cardiology and their recommendations were followed.  Your home blood pressure medications were continued and while you were in the hosptial your blood pressure was well controlled and you did not have symptoms of dizziness or chest pain / pressure.  Please follow up with your PCP and Cardiologist within 1 week      SECONDARY DISCHARGE DIAGNOSES  Diagnosis: Chest pain  Assessment and Plan of Treatment:     Diagnosis: CAD (coronary artery disease)  Assessment and Plan of Treatment:     Diagnosis: Dizziness  Assessment and Plan of Treatment:

## 2023-05-31 NOTE — PROGRESS NOTE ADULT - ATTENDING COMMENTS
c/o dizziness and chest pressure on admission and high BP.  BP controlled, no more dizziness, will monitor overnight, TTE- pending. d/c telemetry  possible dc tomorrow  a/p# HTN urgency # Chest pressure
no acute events o/n. Patient denies any sob/dizziness. TTE showed- new onset heart failure- case d/w Dr. Weiss Cardiology- patient transferred to [facility] for cardiac cath

## 2023-05-31 NOTE — PATIENT PROFILE ADULT - FALL HARM RISK - HARM RISK INTERVENTIONS

## 2023-06-01 VITALS
OXYGEN SATURATION: 100 % | HEART RATE: 78 BPM | SYSTOLIC BLOOD PRESSURE: 146 MMHG | RESPIRATION RATE: 18 BRPM | TEMPERATURE: 98 F | DIASTOLIC BLOOD PRESSURE: 68 MMHG

## 2023-06-01 LAB
ANION GAP SERPL CALC-SCNC: 13 MMOL/L — SIGNIFICANT CHANGE UP (ref 7–14)
BUN SERPL-MCNC: 18 MG/DL — SIGNIFICANT CHANGE UP (ref 7–23)
CALCIUM SERPL-MCNC: 9.3 MG/DL — SIGNIFICANT CHANGE UP (ref 8.4–10.5)
CHLORIDE SERPL-SCNC: 99 MMOL/L — SIGNIFICANT CHANGE UP (ref 98–107)
CO2 SERPL-SCNC: 20 MMOL/L — LOW (ref 22–31)
CREAT SERPL-MCNC: 0.71 MG/DL — SIGNIFICANT CHANGE UP (ref 0.5–1.3)
EGFR: 83 ML/MIN/1.73M2 — SIGNIFICANT CHANGE UP
GLUCOSE SERPL-MCNC: 93 MG/DL — SIGNIFICANT CHANGE UP (ref 70–99)
HCT VFR BLD CALC: 38.6 % — SIGNIFICANT CHANGE UP (ref 34.5–45)
HGB BLD-MCNC: 13 G/DL — SIGNIFICANT CHANGE UP (ref 11.5–15.5)
MAGNESIUM SERPL-MCNC: 2.1 MG/DL — SIGNIFICANT CHANGE UP (ref 1.6–2.6)
MCHC RBC-ENTMCNC: 29.1 PG — SIGNIFICANT CHANGE UP (ref 27–34)
MCHC RBC-ENTMCNC: 33.7 GM/DL — SIGNIFICANT CHANGE UP (ref 32–36)
MCV RBC AUTO: 86.4 FL — SIGNIFICANT CHANGE UP (ref 80–100)
NRBC # BLD: 0 /100 WBCS — SIGNIFICANT CHANGE UP (ref 0–0)
NRBC # FLD: 0 K/UL — SIGNIFICANT CHANGE UP (ref 0–0)
PHOSPHATE SERPL-MCNC: 3.4 MG/DL — SIGNIFICANT CHANGE UP (ref 2.5–4.5)
PLATELET # BLD AUTO: 175 K/UL — SIGNIFICANT CHANGE UP (ref 150–400)
POTASSIUM SERPL-MCNC: 3.9 MMOL/L — SIGNIFICANT CHANGE UP (ref 3.5–5.3)
POTASSIUM SERPL-SCNC: 3.9 MMOL/L — SIGNIFICANT CHANGE UP (ref 3.5–5.3)
RBC # BLD: 4.47 M/UL — SIGNIFICANT CHANGE UP (ref 3.8–5.2)
RBC # FLD: 13.3 % — SIGNIFICANT CHANGE UP (ref 10.3–14.5)
SODIUM SERPL-SCNC: 132 MMOL/L — LOW (ref 135–145)
WBC # BLD: 5.88 K/UL — SIGNIFICANT CHANGE UP (ref 3.8–10.5)
WBC # FLD AUTO: 5.88 K/UL — SIGNIFICANT CHANGE UP (ref 3.8–10.5)

## 2023-06-01 RX ORDER — ATORVASTATIN CALCIUM 80 MG/1
1 TABLET, FILM COATED ORAL
Qty: 30 | Refills: 0
Start: 2023-06-01 | End: 2023-06-30

## 2023-06-01 RX ORDER — CLOPIDOGREL BISULFATE 75 MG/1
1 TABLET, FILM COATED ORAL
Qty: 30 | Refills: 0
Start: 2023-06-01 | End: 2023-06-30

## 2023-06-01 RX ORDER — ACETAMINOPHEN 500 MG
650 TABLET ORAL EVERY 6 HOURS
Refills: 0 | Status: DISCONTINUED | OUTPATIENT
Start: 2023-06-01 | End: 2023-06-01

## 2023-06-01 RX ADMIN — LOSARTAN POTASSIUM 100 MILLIGRAM(S): 100 TABLET, FILM COATED ORAL at 06:20

## 2023-06-01 RX ADMIN — Medication 81 MILLIGRAM(S): at 12:01

## 2023-06-01 RX ADMIN — Medication 25 MILLIGRAM(S): at 06:20

## 2023-06-01 RX ADMIN — CLOPIDOGREL BISULFATE 75 MILLIGRAM(S): 75 TABLET, FILM COATED ORAL at 12:01

## 2023-06-01 RX ADMIN — AMLODIPINE BESYLATE 5 MILLIGRAM(S): 2.5 TABLET ORAL at 06:20

## 2023-06-01 NOTE — DISCHARGE NOTE PROVIDER - NSDCMRMEDTOKEN_GEN_ALL_CORE_FT
alendronate 70 mg oral tablet: 1 tab(s) orally once a month  amLODIPine 5 mg oral tablet: 1 tab(s) orally once a day  aspirin 81 mg oral delayed release tablet: 1 tab(s) orally once a day  atorvastatin 40 mg oral tablet: 1 tab(s) orally once a day (at bedtime)  clopidogrel 75 mg oral tablet: 1 tab(s) orally once a day  hydroCHLOROthiazide 25 mg oral tablet: 1 tab(s) orally once a day  losartan 100 mg oral tablet: 1 tab(s) orally once a day  metoprolol tartrate 25 mg oral tablet: 1 tab(s) orally 2 times a day

## 2023-06-01 NOTE — DISCHARGE NOTE NURSING/CASE MANAGEMENT/SOCIAL WORK - PATIENT PORTAL LINK FT
You can access the FollowMyHealth Patient Portal offered by [facility] by registering at the following website: [email or web address]. By joining [employer]’s FollowMyHealth portal, you will also be able to view your health information using other applications (apps) compatible with our system.

## 2023-06-01 NOTE — DISCHARGE NOTE PROVIDER - PROVIDER TOKENS
PROVIDER:[TOKEN:[[id number]]],FREE:[LAST:[Dr. Jesusita Neely MD],PHONE:[phone]],FAX:[(   )    -],ADDRESS:[Internist  [address] [place] [address]]]

## 2023-06-01 NOTE — DISCHARGE NOTE PROVIDER - NSDCCPCAREPLAN_GEN_ALL_CORE_FT
PRINCIPAL DISCHARGE DIAGNOSIS  Diagnosis: CAD (coronary artery disease)  Assessment and Plan of Treatment: To be asymptomatic, to reduce risks factors such as hypertension, diabetes and hyperlipidemia to lower the risk of blood clots formation; and to prevent complications of coronary artery disease such as worsening chest pain, heart attack and death.   Continue aspirin and Plavix, do not stop unless instructed by your physician.  Continue low salt, fat, cholesterol and carbohydrate diet. Follow up with cardiologist and primary care physician's routine appointment.      SECONDARY DISCHARGE DIAGNOSES  Diagnosis: Hypertension  Assessment and Plan of Treatment: To maintain a normal blood pressure to prevent heart attack, stroke and renal failure.   Low sodium and fat diet, continue anti-hypertensive medications, and follow up with primary care physician.    Diagnosis: Hyperlipidemia  Assessment and Plan of Treatment: To maintain normal cholesterol levels to prevent stroke, coronary artery disease, peripheral vascular disease and heart attacks.   Low fat diet, exercise daily and continue current medications. Follow up with primary care physician and cardiologist for management.

## 2023-06-01 NOTE — CHART NOTE - NSCHARTNOTEFT_GEN_A_CORE
Medicine PA Note    88 y/o Polish speaking female is s/p stent to the mRCA. She is now medically cleared for discharge home as per Dr. Weiss. Used a  to translate and explain her discharge. Language [employer] Mobile ID # [id number] [place]. Pt advised to follow up with her PCP and Cardiologist.    Porfirio Oden PA-C  x [phone]

## 2023-06-01 NOTE — PROVIDER CONTACT NOTE (CHANGE IN STATUS NOTIFICATION) - SITUATION
NP made aware that patient desaturated to 88% on room air, now 99% on 3LNC contin. Obtained Mandarin  May ID #[id number]- per patient denies chest pain denies shortness of breath denies palpitation denies headache, denies pain to R leg per patient is able to feel sensation, extremity warm to touch pulses +2 pt denies numbness or tingling

## 2023-06-01 NOTE — DISCHARGE NOTE PROVIDER - HOSPITAL COURSE
86 y/o female, with a PmHx of HLD, HTN, Arthritis, Osteoporosis, CAD, presented to [facility] diffuse chest pain and high blood pressure associated with bilateral arm pain L>>R. At home, The highest BP was 180s systolic. Describes the arm pain with exertion and while in a moving car, and associated with dizziness, as well as purple in the hand and finger tips, hiccups, shortness of breath  and nausea and occasional palpitations. No radiation, alleviation, aggravating factors. Transferred to [facility] for cardiac cath.    On admission:    1. Chest Pain  5/31 C - mRCA 80% x1 IQRA, RRA accessed  - started on ASA + Plavix    2. HTN  - monitor bp  - cont meds    3. HLD  - lipid profile  - cont statin    Pt comfortable at this time and is now medically for discharge home as per Dr. Weiss. Outpatient follow up.    Reviewed discharge medications with patient; All new medications requiring new prescription sent to pharmacy of patients choice. Reviewed need for prescription from previous home medications and new prescriptions sent if requested. Patient in agreement and understands.

## 2023-06-01 NOTE — DISCHARGE NOTE PROVIDER - CARE PROVIDER_API CALL
Freddy Weiss  Cardiology  [address] [place] [address]  Phone: [phone]  Fax: [phone]  Follow Up Time:     Dr. Jesusita Neely MD,   Internist  [address] [place] [address]  Phone: [phone]  Fax: (   )    -  Follow Up Time:
